# Patient Record
(demographics unavailable — no encounter records)

---

## 2025-02-05 NOTE — REVIEW OF SYSTEMS
[Recent Weight Gain (___ Lbs)] : recent weight gain: [unfilled] lbs [SOB on Exertion] : shortness of breath on exertion [As Noted in HPI] : as noted in HPI [Negative] : Heme/Lymph

## 2025-02-05 NOTE — REVIEW OF SYSTEMS
Post-Care Instructions: I reviewed with the patient in detail post-care instructions. Patient understands to keep the injection sites clean and call the clinic if there is any redness, swelling or pain. Bill J-Code: no units Dose Administered (Numbers Only - Mg, G, Mcg, Units, Cc): 0 Expiration Date (Optional): 08/2017 Lot # (Optional): [id number] Detail Level: Detailed Dose Administered (Numbers Only - Mg, G, Mcg, Units, Cc): 45 Procedure Information: Please note that the numeric value listed in the Medication (1) and associated J-code units and Medication (2) and associated J-code units variables are j-code amounts and do not represent either the concentration or the total amount of the medications injected.  I strongly recommend selecting no to the Render J-code information in note question. This will allow your note to be more clear. If you are billing j-codes with your injection codes you need to document the total amount of the medication injected. This amount should match the j-code units. For example, if you are injecting Triamcinolone 40mg as an intramuscular injection you would select 40 for the dose field and mg for the units. This would allow you to document  with 4 units (40mg = 10mg x 4). The total volume is not used to calculate j-codes only the amount of the medication administered. Consent: The risks of the medication was reviewed with the patient. Administered By (Optional): BARBARA Medication (1) And Associated J-Code Units: Ustekinumab (Stelara), 1mg Route: SC Units: mg [Recent Weight Gain (___ Lbs)] : recent weight gain: [unfilled] lbs [SOB on Exertion] : shortness of breath on exertion [As Noted in HPI] : as noted in HPI [Negative] : Heme/Lymph

## 2025-02-07 NOTE — ASSESSMENT
[FreeTextEntry1] : Type 2 DM, not well controlled, with no known complications  Currently Metformin 1000mg ER daily, A1c above goal at 7.7% today. Reports period of well-controlled DM with use of Rybelsus/Metformin/strict diet and weight loss but had stopped Rybelsus after achieving weight loss and also gave her constipation. She has been hoping to lose weight (feels SOB when walking/stairs) and has been considering restarting another GLP. She was initially skeptical of the idea of a once weekly injectable as she's already taking Repatha but warmed to the idea, especially after recognizing she's been considering this for a while. She has a cardiac murmur (for which she sees cardiologist) and history of HLD, so additional cardiac protection would be useful.   Goal A1c <7%, A1c 7.7% Goal BP <130/80, BP today 152/90, not on regimen Goal LDL <100mg/dL, recent LDL unknown, on Repatha   Plan --  Labs today, will call w/results -- Start Ozempic 0.25mg weekly injection (I will call her tomorrow w/labs and also send copy of [facility] medlink injection instructions) -- Continue Metformin 1g ER daily -- Start checking BG at home, FBS/PPBS to write on log and bring to next appt -- Reviewed and encouraged lifestyle modifications for good DM care including aiming for 150 min moderate exercise weekly and ADA healthy eating tips. -- Continue to follow up with ophthalmologist. -- Nutritionist referral -- Follow up with me in 2 months, with MD in 5 months  2. Osteoporosis Diagnosed in the last few years by previous Endocrinologist after history of 4 total rib fractures, compression fracture She has declined treatment as she's not sure it will be beneficial for her  Plan -- Labs today (including TSH, Vitamin D, serum creatinine, calcium) -- DEXA scan ordered for further evaluation -- Plan to initiate treatment based on results of labs/DEXA -- Fall prevention -- Will instruct on adequate calcium, vitamin D intake daily (calcium: 1000-1200mg daily from diet and supplements; vitamin D 1000 IU daily from diet and supplements) when I call with labs -- Follow up with me in 2 months, with MD in 5 months

## 2025-02-07 NOTE — PHYSICAL EXAM
[Alert] : alert [No Acute Distress] : no acute distress [EOMI] : extra ocular movement intact [Normal Hearing] : hearing was normal [Thyroid Not Enlarged] : the thyroid was not enlarged [No Thyroid Nodules] : no palpable thyroid nodules [No Respiratory Distress] : no respiratory distress [No Accessory Muscle Use] : no accessory muscle use [Normal Rate and Effort] : normal respiratory rate and effort [Clear to Auscultation] : lungs were clear to auscultation bilaterally [Pedal Pulses Normal] : the pedal pulses are present [Normal Bowel Sounds] : normal bowel sounds [Soft] : abdomen soft [Spine Straight] : spine straight [Normal Gait] : normal gait [No Joint Swelling] : no joint swelling seen [Normal] : normal [2+] : 2+ in the dorsalis pedis [Oriented x3] : oriented to person, place, and time [Normal Insight/Judgement] : insight and judgment were intact [Kyphosis] : no kyphosis present [de-identified] : holosystolic murmur, normal rate [de-identified] : 1+ edema bilateral LE [de-identified] : central obesity, but no buffalo hump, moon facies, + SC fat pads

## 2025-02-07 NOTE — ADDENDUM
[FreeTextEntry1] : 2/6/25 Called Luzma about results of labs from 2/5/25, UTR but left VM with request for callback A1c 8.7% -- higher than in office POCT A1c of 7.7% -- plan to start Ozempic, prescription sent Tg 164, fasting. Advised reduction of carbohydrate intake given elevated Tg. LDL at goal, 56. ACR 15, normal serum creatinine, CMP results. TSH normal.  Vitamin D 24.5 -- recommended supplementation of 1000 IU daily given osteoporosis. Will also discuss calcium when I speak with her next.  2/7/25: Spoke w/pt, conveyed the above including recommendation that she ensure 1000mg calcium daily from diet and supplements given osteoporosis. She was taking Vitamin D in the past but ran out and didn't restart.  Said she heard from pharmacy that they're working on ozempic, talking with insurance -- likely needs PA, I will submit. She'll let me know if she hasn't been able to start/receive the medication in the next couple weeks.

## 2025-02-07 NOTE — HISTORY OF PRESENT ILLNESS
[FreeTextEntry1] : Luzma Mosquera is a 70 year old female who presents to establish care for Diabetes Mellitus Type 2.   PMHx: Type 2 DM, osteoporosis, Class I obesity, murmur/"valve blockage" per cardiologist, HLD PSHx: cataract surgery (2023) FMHx: Diabetes (father, grandfather), lung cancer w/bone mets (father), HTN, AAA, cancer (mother), Allergy to codeine   Diagnosed with Diabetes in October 2020 via routine labs with PCP  Referred to Dr. Diane Amezcua, Endocrinologist -- started Metformin/Rybelsus and changed diet by started seeing nutritionist Lost 55lbs between October 2020 and July 2021, A1c went down and weight was 135lbs Rybelsus was stopped after weight lost Difficult to sustain weight loss given restrictive diet so weight regained after about Fall 2021 [place] recently previous endocrinologist Dr. Diane Amezcua stopped taking insurance Has considered restarting GLP to help with weight loss/DM control She is taking Repatha (prescribed by cardiologist after intolerance to statins) and she does not like injections  Is also worried about constipation (though has recently had more intermittent diarrhea) Says eating is out of control and thinks she urinates a lot but denies excess thirst  POCT A1c in office: 7.7% FS in office: 175 -- fasting   Current Regimen: -- Metformin 1000mg ER once daily   Previous regimens: -- Rybelsus (constipation)    Not checking BG at home Sometimes feels BG is low in late afternoon (no hypo symptoms but has low energy at this time)  - Diet: Has been higher carb lately -- has breakfast at her diner - eggs, lezama, toast and grits or potatoes Lunch - may have frozen meal in freezer or cheese and crackers Dinner - eats potato chips or corn chips for dinner with homemade dip may have snack in late afternoon if hungry - chips or crackers No soda/juice - Exercise: has been doing PT for IT band issues since October 2024, doing exercises at home   Denies complications r/t DM  Ophthalmologist: last saw January 2024 -- no DR, due for follow up  Podiatrist: Does not see, denies neuropathy   Social Hx: Has 1-2 alcohol drinks/month 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, 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

## 2025-02-07 NOTE — HISTORY OF PRESENT ILLNESS
[FreeTextEntry1] : Luzma Mosquera is a 70 year old female who presents to establish care for Diabetes Mellitus Type 2.   PMHx: Type 2 DM, osteoporosis, Class I obesity, murmur/"valve blockage" per cardiologist, HLD PSHx: cataract surgery (2023) FMHx: Diabetes (father, grandfather), lung cancer w/bone mets (father), HTN, AAA, cancer (mother), Allergy to codeine   Diagnosed with Diabetes in October 2020 via routine labs with PCP  Referred to Dr. Diane Amezcua, Endocrinologist -- started Metformin/Rybelsus and changed diet by started seeing nutritionist Lost 55lbs between October 2020 and July 2021, A1c went down and weight was 135lbs Rybelsus was stopped after weight lost Difficult to sustain weight loss given restrictive diet so weight regained after about Fall 2021 [place] recently previous endocrinologist Dr. Diane Amezcua stopped taking insurance Has considered restarting GLP to help with weight loss/DM control She is taking Repatha (prescribed by cardiologist after intolerance to statins) and she does not like injections  Is also worried about constipation (though has recently had more intermittent diarrhea) Says eating is out of control and thinks she urinates a lot but denies excess thirst  POCT A1c in office: 7.7% FS in office: 175 -- fasting   Current Regimen: -- Metformin 1000mg ER once daily   Previous regimens: -- Rybelsus (constipation)    Not checking BG at home Sometimes feels BG is low in late afternoon (no hypo symptoms but has low energy at this time)  - Diet: Has been higher carb lately -- has breakfast at her diner - eggs, lezama, toast and grits or potatoes Lunch - may have frozen meal in freezer or cheese and crackers Dinner - eats potato chips or corn chips for dinner with homemade dip may have snack in late afternoon if hungry - chips or crackers No soda/juice - Exercise: has been doing PT for IT band issues since October 2024, doing exercises at home   Denies complications r/t DM  Ophthalmologist: last saw January 2024 -- no DR, due for follow up  Podiatrist: Does not see, denies neuropathy   Social Hx: Has 1-2 alcohol drinks/month 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, 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

## 2025-02-07 NOTE — PHYSICAL EXAM
[Alert] : alert [No Acute Distress] : no acute distress [EOMI] : extra ocular movement intact [Normal Hearing] : hearing was normal [Thyroid Not Enlarged] : the thyroid was not enlarged [No Thyroid Nodules] : no palpable thyroid nodules [No Respiratory Distress] : no respiratory distress [No Accessory Muscle Use] : no accessory muscle use [Normal Rate and Effort] : normal respiratory rate and effort [Clear to Auscultation] : lungs were clear to auscultation bilaterally [Pedal Pulses Normal] : the pedal pulses are present [Normal Bowel Sounds] : normal bowel sounds [Soft] : abdomen soft [Spine Straight] : spine straight [Normal Gait] : normal gait [No Joint Swelling] : no joint swelling seen [Normal] : normal [2+] : 2+ in the dorsalis pedis [Oriented x3] : oriented to person, place, and time [Normal Insight/Judgement] : insight and judgment were intact [Kyphosis] : no kyphosis present [de-identified] : holosystolic murmur, normal rate [de-identified] : 1+ edema bilateral LE [de-identified] : central obesity, but no buffalo hump, moon facies, + SC fat pads

## 2025-02-07 NOTE — PHYSICAL EXAM
[Alert] : alert [No Acute Distress] : no acute distress [EOMI] : extra ocular movement intact [Normal Hearing] : hearing was normal [Thyroid Not Enlarged] : the thyroid was not enlarged [No Thyroid Nodules] : no palpable thyroid nodules [No Respiratory Distress] : no respiratory distress [No Accessory Muscle Use] : no accessory muscle use [Normal Rate and Effort] : normal respiratory rate and effort [Clear to Auscultation] : lungs were clear to auscultation bilaterally [Pedal Pulses Normal] : the pedal pulses are present [Normal Bowel Sounds] : normal bowel sounds [Soft] : abdomen soft [Spine Straight] : spine straight [Normal Gait] : normal gait [No Joint Swelling] : no joint swelling seen [Normal] : normal [2+] : 2+ in the dorsalis pedis [Oriented x3] : oriented to person, place, and time [Normal Insight/Judgement] : insight and judgment were intact [Kyphosis] : no kyphosis present [de-identified] : holosystolic murmur, normal rate [de-identified] : 1+ edema bilateral LE [de-identified] : central obesity, but no buffalo hump, moon facies, + SC fat pads

## 2025-04-09 NOTE — PHYSICAL EXAM
[Kyphosis] : no kyphosis present [de-identified] : + holosystolic murmur (followed by cardiology)

## 2025-04-09 NOTE — ASSESSMENT
[FreeTextEntry1] : 1. Type 2 DM, not well controlled, with no known complications  Current regimen: Ozempic 0.25mg weekly, Metformin 1g BID  Luzma appears to be improving; her A1c is 7.3% in office today after increasing Metformin and starting Ozempic. She has not started checking blood glucose at home, but FS in office 155 is ~1.5hrs PPBS, at goal, and A1c improving. We discussed continued up-titration of Ozempic, home blood glucose monitoring and goal glucose ranges, importance of healthy diet/physical activity, and continued follow up with ophthalmology.   Goal A1c <7%, A1c 7.3% Goal BP <130/80, BP today 140/88, not on regimen Goal LDL <100mg/dL, recent LDL 56, on Repatha   Plan -- Check A1c on serum to confirm reduction in POCT A1c -- Increase Ozempic to 0.5mg weekly after fourth dose of 0.25mg weekly -- Continue Metformin 1g ER BID -- Start checking BG at home, FBS/PPBS to write on log and bring to next appt (resent testing supplies and reviewed testing schedule, goal ranges) -- Advised continued cardiology follow up (especially given history of falls, occasional dizziness, cardiac murmur) -- Reviewed and encouraged lifestyle modifications for good DM care including aiming for 150 min moderate exercise weekly and ADA healthy eating tips. -- Continue to follow up with ophthalmologist. -- Follow up with Dr. Bennett in August, with me in October.  2. Osteoporosis Diagnosed in the last few years by previous Endocrinologist after history of 4 total rib fractures, compression fracture Has not yet had DEXA scan Labs from 2/5/25 indicate normal renal function, euthyroid.  Vitamin D low at 24.5 (2/5/25) Briefly reviewed medication options for osteoporosis treatment for future initiation.  Plan -- Labs today (check PTH given low vitamin D), will call w/results -- Complete DEXA scan and plan to initiate treatment based on results of labs/DEXA -- Discussed importance of fall prevention and weight bearing exercises -- Reviewed daily intake goals: for calcium: 1000-1200mg daily from diet and supplements; vitamin D 1000 IU daily from diet and supplements -- Follow up with Dr. Bennett as planned in August, with me in October

## 2025-04-09 NOTE — HISTORY OF PRESENT ILLNESS
[FreeTextEntry1] : Luzma Mosquera is a 70 year old female who presents for Diabetes Mellitus Type 2 follow up. 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 Has occasional constipation and a little nausea but no vomiting Taking higher dose of metformin -- feeling well  POCT A1c in office: 7.3% FS in office: 155 -- 1.5hrs PPBS, had 1/2 English muffin with jam and few slices of cheddar cheese at 10AM   Current Regimen: -- Metformin 1000mg ER BID -- Ozempic 0.25mg weekly   Luzma is not checking blood glucose via fingerstick/continuous glucose monitor. Hypoglycemia -- occasionally feels she has "very low energy" but has not confirmed via FS  - Diet: "not great" B - may have English muffin w/cheese and jam or PB and jam OR oatmeal OR eggs/lezama at diner (but this is less often now) Lunch - varies, may be a mid-afternoon snack (chips or crackers) Dinner - later on, varies, may eat takeout Eating less pasta No soda/juice - Exercise: Still doing PT exercises for IT band, walking a little more now  Ophthalmologist: last saw January 2024 -- no DR, due for follow up -- has appt for May   Osteoporosis Diagnosed by previous Endocrinologist after history of bone fractures: L11 compression fracture around 2022 which occurred after she strained back opening window and then pain felt during dance class; 3 broken ribs (L) after a fall of unclear reason in May 2024; 1 broken rib (R) after fall while going upstairs in later 2024 Has not yet had DEXA scan but plans to do so. Denies kidney stones Endorses occasional memory issues and constipation.   Current medications: Metformin 1000mg once daily, Ozempic 0.25mg weekly, Repatha every 2 weeks

## 2025-04-09 NOTE — PHYSICAL EXAM
[Kyphosis] : no kyphosis present [de-identified] : + holosystolic murmur (followed by cardiology)

## 2025-04-09 NOTE — DATA REVIEWED
Call placed to inform patient we need to reschedule her follow up appt 12/24/19, no answer, message left with call back number.  
[FreeTextEntry1] : 2/5/25 A1c 8.7% serum creatinine 0.75, GFR 86 Tg 164, total-c 146, HDL 63, LDL 56 TSH 2.77 ACR 15 vitamin D 24.5
[FreeTextEntry1] : 2/5/25 A1c 8.7% serum creatinine 0.75, GFR 86 Tg 164, total-c 146, HDL 63, LDL 56 TSH 2.77 ACR 15 vitamin D 24.5

## 2025-04-09 NOTE — ADDENDUM
[FreeTextEntry1] : 4/9/25 Called Luzma to review results of labs from yesterday PTH 24 with calcium 10.2, normal Normal CBC. A1c 8.1% (higher than in office again), but improved from 8.7%, will continue with plan to increase Ozempic after fourth dose of 0.25mg weekly and continue Metformin. Luzma in agreement. Sent prescription for vitamin D 1000 IU daily as vitamin D was 24.5 in February and she has not started supplementing. She will look at her calcium intake and start supplementing if needed via OTC supplements.